# Patient Record
Sex: MALE | Race: WHITE | NOT HISPANIC OR LATINO
[De-identification: names, ages, dates, MRNs, and addresses within clinical notes are randomized per-mention and may not be internally consistent; named-entity substitution may affect disease eponyms.]

---

## 2018-10-27 PROBLEM — Z00.00 ENCOUNTER FOR PREVENTIVE HEALTH EXAMINATION: Status: ACTIVE | Noted: 2018-10-27

## 2018-11-02 ENCOUNTER — APPOINTMENT (OUTPATIENT)
Dept: THORACIC SURGERY | Facility: CLINIC | Age: 63
End: 2018-11-02
Payer: MEDICARE

## 2018-11-02 ENCOUNTER — APPOINTMENT (OUTPATIENT)
Dept: PULMONOLOGY | Facility: CLINIC | Age: 63
End: 2018-11-02
Payer: MEDICARE

## 2018-11-02 VITALS
BODY MASS INDEX: 33.43 KG/M2 | HEIGHT: 66 IN | WEIGHT: 208 LBS | HEART RATE: 89 BPM | RESPIRATION RATE: 19 BRPM | DIASTOLIC BLOOD PRESSURE: 71 MMHG | OXYGEN SATURATION: 93 % | SYSTOLIC BLOOD PRESSURE: 126 MMHG | TEMPERATURE: 98.7 F

## 2018-11-02 VITALS — WEIGHT: 208 LBS | HEIGHT: 66 IN | BODY MASS INDEX: 33.43 KG/M2 | OXYGEN SATURATION: 97 % | HEART RATE: 89 BPM

## 2018-11-02 DIAGNOSIS — Z86.69 PERSONAL HISTORY OF OTHER DISEASES OF THE NERVOUS SYSTEM AND SENSE ORGANS: ICD-10-CM

## 2018-11-02 DIAGNOSIS — G47.33 OBSTRUCTIVE SLEEP APNEA (ADULT) (PEDIATRIC): ICD-10-CM

## 2018-11-02 DIAGNOSIS — Z87.891 PERSONAL HISTORY OF NICOTINE DEPENDENCE: ICD-10-CM

## 2018-11-02 PROCEDURE — 99205 OFFICE O/P NEW HI 60 MIN: CPT

## 2018-11-02 PROCEDURE — 99204 OFFICE O/P NEW MOD 45 MIN: CPT | Mod: 25

## 2018-11-02 PROCEDURE — 94729 DIFFUSING CAPACITY: CPT

## 2018-11-02 PROCEDURE — 94010 BREATHING CAPACITY TEST: CPT

## 2018-11-05 ENCOUNTER — OTHER (OUTPATIENT)
Age: 63
End: 2018-11-05

## 2019-01-23 ENCOUNTER — APPOINTMENT (OUTPATIENT)
Dept: PULMONOLOGY | Facility: CLINIC | Age: 64
End: 2019-01-23

## 2019-01-28 ENCOUNTER — OUTPATIENT (OUTPATIENT)
Dept: OUTPATIENT SERVICES | Facility: HOSPITAL | Age: 64
LOS: 1 days | End: 2019-01-28
Payer: MEDICARE

## 2019-01-28 ENCOUNTER — APPOINTMENT (OUTPATIENT)
Dept: CT IMAGING | Facility: HOSPITAL | Age: 64
End: 2019-01-28
Payer: MEDICARE

## 2019-01-28 PROCEDURE — 71250 CT THORAX DX C-: CPT

## 2019-01-28 PROCEDURE — 71250 CT THORAX DX C-: CPT | Mod: 26

## 2019-04-04 ENCOUNTER — APPOINTMENT (OUTPATIENT)
Dept: THORACIC SURGERY | Facility: CLINIC | Age: 64
End: 2019-04-04
Payer: MEDICARE

## 2019-04-04 VITALS
DIASTOLIC BLOOD PRESSURE: 70 MMHG | TEMPERATURE: 98 F | OXYGEN SATURATION: 98 % | HEART RATE: 80 BPM | WEIGHT: 205 LBS | BODY MASS INDEX: 32.95 KG/M2 | HEIGHT: 66 IN | RESPIRATION RATE: 17 BRPM | SYSTOLIC BLOOD PRESSURE: 140 MMHG

## 2019-04-04 PROCEDURE — 99215 OFFICE O/P EST HI 40 MIN: CPT

## 2019-04-04 NOTE — PHYSICAL EXAM
[Sclera] : the sclera and conjunctiva were normal [PERRL With Normal Accommodation] : pupils were equal in size, round, and reactive to light [Neck Appearance] : the appearance of the neck was normal [Neck Cervical Mass (___cm)] : no neck mass was observed [Respiration, Rhythm And Depth] : normal respiratory rhythm and effort [Apical Impulse] : the apical impulse was normal [Heart Rate And Rhythm] : heart rate was normal and rhythm regular [Examination Of The Chest] : the chest was normal in appearance [2+] : left 2+ [Bowel Sounds] : normal bowel sounds [Abdomen Soft] : soft [Abdomen Tenderness] : non-tender [No CVA Tenderness] : no ~M costovertebral angle tenderness [Abnormal Walk] : normal gait [Nail Clubbing] : no clubbing  or cyanosis of the fingernails [Musculoskeletal - Swelling] : no joint swelling seen [Skin Color & Pigmentation] : normal skin color and pigmentation [Skin Turgor] : normal skin turgor [] : no rash [Oriented To Time, Place, And Person] : oriented to person, place, and time [Impaired Insight] : insight and judgment were intact [Affect] : the affect was normal

## 2019-04-05 NOTE — HISTORY OF PRESENT ILLNESS
[FreeTextEntry1] : 63 yr old male, former smoker (20 pack years, quit 10 years ago), with a PMH of retinal detachment, and inguinal hernia repair. He was recently evaluated by Dr. De Jesus and underwent a CT chest (patient is unsure why) no complaints at that time. He presents today for surgical evaluation of the Right lung base pulmonary nodule. He presents today to discuss surgical resection. He is referred by Dr. Juarez. \par \par Today her reports feeling well. He denies fever, chills, cough, SOB and hemoptysis. \par \par CT chest done on 10/17/18:\par -1.3 cm indeterminate nodule of the Right lung base. Additional 3mm nodule of the Right upper lobe. \par \par PET CT done on 11/02/18:\par -no appreciable FDG activity of the Right lower lobe lesion. \par -no hilar or mediastinal adenopathy noted.\par -small hiatal hernia with nonspecific increased FDG activity in the distal esophagus. \par \par CT chest completed on 01/28/19:\par -evidence of a 10mm linear opacity within irregular margins within the anterior basal segment of the Right lower lobe. \par -traction bronchiectasis and distortion of the major fissure\par \par Thoracic Tumor Board Consensus on 02/21/19 was to undergo a Right lower lobectomy

## 2019-04-05 NOTE — ADDENDUM
[FreeTextEntry1] : Documented by Bird Tijerina acting as a scribe for Dr. Romeo Morris on 04/04/2019

## 2019-04-05 NOTE — ASSESSMENT
[FreeTextEntry1] : 63 yr old male, former smoker with right lower lobe lesion. Clinically, he is doing well, no cough, shortness of breath, fever, chills, recent illness, or recent hospitalizations. \par \par Recent PET CT and CT chest were reviewed, revealing a 10mm linear opacity within irregular margins within the anterior basal segment of the right lower lobe without FDG activity noted on the PET. Discussed possible etiologies including infection vs inflammation vs malignancy and that given the location of the lesion it would be difficult to perform a CT guided biopsy or EBUS. \par \par Patient was discussed at tumor board and the consensus was to proceed with Bronchoscopy, right VATs, robotic assisted, right lower lobe wedge resection, intraoperative frozen section, possible right lower lobectomy, mediastinal lymph node dissection. Patient is hesitant to proceed with this. We will instead repeat CT chest without contrast in 1 month, if lesion has not resolved or changed, will contact patient proceed with removal. \par \par I have reviewed the patient's medical records and diagnostic images at the time of this office visit and have made the following recommendations\par \par Plan:\par 1.1 month with CT chest, we will contact him and schedule the surgery if warranted

## 2019-04-05 NOTE — END OF VISIT
[FreeTextEntry3] : All medical record entries made by the Scribe were at my, Dr. Morris's direction and personally dictated by me on 04/04/2019  I have reviewed the chart and agree that the record accurately reflects my personal performance of the history, physical exam, assessment and plan. I have also personally directed, reviewed, and agreed with the chart.

## 2019-05-09 ENCOUNTER — FORM ENCOUNTER (OUTPATIENT)
Age: 64
End: 2019-05-09

## 2019-05-10 ENCOUNTER — OUTPATIENT (OUTPATIENT)
Dept: OUTPATIENT SERVICES | Facility: HOSPITAL | Age: 64
LOS: 1 days | End: 2019-05-10
Payer: MEDICARE

## 2019-05-10 ENCOUNTER — APPOINTMENT (OUTPATIENT)
Dept: CT IMAGING | Facility: HOSPITAL | Age: 64
End: 2019-05-10
Payer: MEDICARE

## 2019-05-10 PROCEDURE — 71250 CT THORAX DX C-: CPT

## 2019-05-10 PROCEDURE — 71250 CT THORAX DX C-: CPT | Mod: 26

## 2019-05-23 ENCOUNTER — OUTPATIENT (OUTPATIENT)
Dept: OUTPATIENT SERVICES | Facility: HOSPITAL | Age: 64
LOS: 1 days | End: 2019-05-23

## 2019-05-24 ENCOUNTER — OUTPATIENT (OUTPATIENT)
Dept: OUTPATIENT SERVICES | Facility: HOSPITAL | Age: 64
LOS: 1 days | End: 2019-05-24
Payer: MEDICARE

## 2019-05-24 PROCEDURE — 70491 CT SOFT TISSUE NECK W/DYE: CPT | Mod: 26

## 2019-08-01 ENCOUNTER — OUTPATIENT (OUTPATIENT)
Dept: OUTPATIENT SERVICES | Facility: HOSPITAL | Age: 64
LOS: 1 days | Discharge: HOME | End: 2019-08-01

## 2019-08-21 ENCOUNTER — APPOINTMENT (OUTPATIENT)
Dept: CT IMAGING | Facility: HOSPITAL | Age: 64
End: 2019-08-21

## 2019-09-12 ENCOUNTER — OUTPATIENT (OUTPATIENT)
Dept: OUTPATIENT SERVICES | Facility: HOSPITAL | Age: 64
LOS: 1 days | Discharge: HOME | End: 2019-09-12

## 2019-09-12 ENCOUNTER — RESULT REVIEW (OUTPATIENT)
Age: 64
End: 2019-09-12

## 2019-09-19 ENCOUNTER — APPOINTMENT (OUTPATIENT)
Dept: UROLOGY | Facility: CLINIC | Age: 64
End: 2019-09-19

## 2019-09-19 ENCOUNTER — APPOINTMENT (OUTPATIENT)
Dept: CT IMAGING | Facility: HOSPITAL | Age: 64
End: 2019-09-19

## 2019-09-23 LAB — SURGICAL PATHOLOGY STUDY: SIGNIFICANT CHANGE UP

## 2020-01-30 ENCOUNTER — APPOINTMENT (OUTPATIENT)
Dept: CT IMAGING | Facility: HOSPITAL | Age: 65
End: 2020-01-30
Payer: MEDICARE

## 2020-01-30 ENCOUNTER — OUTPATIENT (OUTPATIENT)
Dept: OUTPATIENT SERVICES | Facility: HOSPITAL | Age: 65
LOS: 1 days | End: 2020-01-30
Payer: MEDICARE

## 2020-01-30 PROCEDURE — 71250 CT THORAX DX C-: CPT

## 2020-01-30 PROCEDURE — 71250 CT THORAX DX C-: CPT | Mod: 26

## 2020-02-06 ENCOUNTER — APPOINTMENT (OUTPATIENT)
Dept: THORACIC SURGERY | Facility: CLINIC | Age: 65
End: 2020-02-06
Payer: MEDICARE

## 2020-02-06 VITALS
OXYGEN SATURATION: 94 % | TEMPERATURE: 96.5 F | HEIGHT: 66 IN | HEART RATE: 80 BPM | WEIGHT: 208 LBS | SYSTOLIC BLOOD PRESSURE: 137 MMHG | RESPIRATION RATE: 18 BRPM | BODY MASS INDEX: 33.43 KG/M2 | DIASTOLIC BLOOD PRESSURE: 78 MMHG

## 2020-02-06 DIAGNOSIS — R91.1 SOLITARY PULMONARY NODULE: ICD-10-CM

## 2020-02-06 PROCEDURE — 99213 OFFICE O/P EST LOW 20 MIN: CPT

## 2020-02-06 NOTE — PHYSICAL EXAM
[] : no respiratory distress [Auscultation Breath Sounds / Voice Sounds] : lungs were clear to auscultation bilaterally [Exaggerated Use Of Accessory Muscles For Inspiration] : no accessory muscle use [Respiration, Rhythm And Depth] : normal respiratory rhythm and effort [Apical Impulse] : the apical impulse was normal [Heart Sounds] : normal S1 and S2 [2+] : left 2+ [Examination Of The Chest] : the chest was normal in appearance [Abnormal Walk] : normal gait [Musculoskeletal - Swelling] : no joint swelling seen [Skin Color & Pigmentation] : normal skin color and pigmentation [No Focal Deficits] : no focal deficits

## 2020-02-10 NOTE — ADDENDUM
[FreeTextEntry1] : Documented by Marquise Redd acting as a scribe for Dr. Luis Antonio Briceno on 02/06/2020.\par \par

## 2020-02-10 NOTE — HISTORY OF PRESENT ILLNESS
[FreeTextEntry1] : 64 yr old male, former smoker (20 pack years, quit 10 years ago), with a PMH of retinal detachment, and inguinal hernia repair. He was recently evaluated by Dr. De Jesus and underwent a CT chest (patient is unsure why) revealing a RLL linear opacity. TTB consensus on 2/21/19 was to undergo surgical resection. Patient was hesitant and surveillance imaging was ordered. He presents today to review recent CT chest. Patient doing well. Denies SOB, cough, hemoptysis, chest discomfort, fever/chills. \par \par CT chest done on 10/17/18:\par -1.3 cm indeterminate nodule of the Right lung base. Additional 3mm nodule of the Right upper lobe. \par \par PET CT done on 11/02/18:\par -no appreciable FDG activity of the Right lower lobe lesion. \par -no hilar or mediastinal adenopathy noted.\par -small hiatal hernia with nonspecific increased FDG activity in the distal esophagus. \par \par CT chest completed on 01/28/19:\par -evidence of a 10mm linear opacity within irregular margins within the anterior basal segment of the Right lower lobe. \par -traction bronchiectasis and distortion of the major fissure\par \par Thoracic Tumor Board Consensus on 02/21/19 was to undergo a Right lower lobectomy \par \par CT chest completed on 5/10/2019:\par - Short-term interval stability of a irregularly marginated right basilar opacity; additional follow-up is \par recommended, to optimally document two-year stability.\par \par CT chest completed on 1/30/2020:\par - 12 mm curvilinear nodule abutting the diaphragmatic pleura within the anterobasal segment of the right lower lobe. This is unchanged from 5/10/2019 and slightly more conspicuous in size when compared to 1/28/19. This perceived change may be due to differences in imaging technique\par

## 2020-02-10 NOTE — END OF VISIT
[FreeTextEntry3] : All the medical record entries made by the Scribe were at my, Dr. Morris's direction and personally dictated by me on 02/06/2020. I have reviewed the chart and agree that the record accurately reflects my personal performance of the history, physical exam, assessment, and plan. I have also personally directed, reviewed and agreed with the chart.

## 2020-02-10 NOTE — ASSESSMENT
[FreeTextEntry1] : 64 yr old male, former smoker (20 pack years, quit 10 years ago), with a PMH of retinal detachment, and inguinal hernia repair. He was recently evaluated by Dr. De Jesus and underwent a CT chest (patient is unsure why) revealing a RLL linear opacity. TTB consensus on 2/21/19 was to undergo surgical resection. Patient was hesitant and surveillance imaging was ordered. \par \par Reviewed CT chest 1/30/2020 with patient and his wife which demonstrates 12 mm curvilinear nodule abutting the diaphragmatic pleura within the anterobasal segment of the right lower lobe, unchanged from 5/10/2019 and slightly more conspicuous in size when compared to 1/28/19. Given that the nodule has not changed significantly, I advise that we continue to monitor with imaging. Patient will repeat CT chest in 3 months and if the nodule increases in size or appears more solid, we will most likely recommend surgical excision of the nodule. \par \par I have reviewed the patient's medical records and diagnostic images at the time of this office consultation and have made the following recommendation.\par \par Plan:\par 1. CT chest in 3 months, may contact patient with results

## 2020-08-26 ENCOUNTER — APPOINTMENT (OUTPATIENT)
Dept: CT IMAGING | Facility: HOSPITAL | Age: 65
End: 2020-08-26

## 2021-02-19 ENCOUNTER — RESULT REVIEW (OUTPATIENT)
Age: 66
End: 2021-02-19

## 2021-02-19 ENCOUNTER — OUTPATIENT (OUTPATIENT)
Dept: OUTPATIENT SERVICES | Facility: HOSPITAL | Age: 66
LOS: 1 days | End: 2021-02-19
Payer: MEDICARE

## 2021-02-19 ENCOUNTER — APPOINTMENT (OUTPATIENT)
Dept: CT IMAGING | Facility: HOSPITAL | Age: 66
End: 2021-02-19

## 2021-02-19 PROCEDURE — 71250 CT THORAX DX C-: CPT

## 2021-02-19 PROCEDURE — 71250 CT THORAX DX C-: CPT | Mod: 26,MH

## 2021-04-21 ENCOUNTER — APPOINTMENT (OUTPATIENT)
Age: 66
End: 2021-04-21
Payer: MEDICARE

## 2021-04-21 PROCEDURE — 0011A: CPT

## 2021-05-19 ENCOUNTER — APPOINTMENT (OUTPATIENT)
Age: 66
End: 2021-05-19
Payer: MEDICARE

## 2021-05-19 PROCEDURE — 0012A: CPT

## 2022-03-15 ENCOUNTER — APPOINTMENT (OUTPATIENT)
Dept: CT IMAGING | Facility: HOSPITAL | Age: 67
End: 2022-03-15

## 2022-10-11 ENCOUNTER — NON-APPOINTMENT (OUTPATIENT)
Age: 67
End: 2022-10-11

## 2023-07-11 ENCOUNTER — TRANSCRIPTION ENCOUNTER (OUTPATIENT)
Age: 68
End: 2023-07-11

## 2024-01-04 ENCOUNTER — NON-APPOINTMENT (OUTPATIENT)
Age: 69
End: 2024-01-04

## 2024-01-04 ENCOUNTER — APPOINTMENT (OUTPATIENT)
Dept: OPHTHALMOLOGY | Facility: CLINIC | Age: 69
End: 2024-01-04
Payer: MEDICARE

## 2024-01-04 PROCEDURE — 99204 OFFICE O/P NEW MOD 45 MIN: CPT

## 2024-02-05 ENCOUNTER — APPOINTMENT (OUTPATIENT)
Dept: OPHTHALMOLOGY | Facility: CLINIC | Age: 69
End: 2024-02-05

## 2024-02-26 ENCOUNTER — APPOINTMENT (OUTPATIENT)
Dept: OPHTHALMOLOGY | Facility: HOSPITAL | Age: 69
End: 2024-02-26

## 2024-02-27 ENCOUNTER — APPOINTMENT (OUTPATIENT)
Dept: OPHTHALMOLOGY | Facility: CLINIC | Age: 69
End: 2024-02-27

## 2024-03-04 ENCOUNTER — APPOINTMENT (OUTPATIENT)
Dept: OPHTHALMOLOGY | Facility: CLINIC | Age: 69
End: 2024-03-04

## 2024-03-05 ENCOUNTER — APPOINTMENT (OUTPATIENT)
Dept: OPHTHALMOLOGY | Facility: CLINIC | Age: 69
End: 2024-03-05

## 2024-03-11 ENCOUNTER — APPOINTMENT (OUTPATIENT)
Dept: OPHTHALMOLOGY | Facility: CLINIC | Age: 69
End: 2024-03-11